# Patient Record
Sex: MALE | Race: ASIAN
[De-identification: names, ages, dates, MRNs, and addresses within clinical notes are randomized per-mention and may not be internally consistent; named-entity substitution may affect disease eponyms.]

---

## 2020-12-10 ENCOUNTER — HOSPITAL ENCOUNTER (OUTPATIENT)
Dept: HOSPITAL 35 - LAB | Age: 55
End: 2020-12-10
Attending: INTERNAL MEDICINE
Payer: COMMERCIAL

## 2020-12-10 DIAGNOSIS — U07.1: Primary | ICD-10-CM

## 2020-12-21 ENCOUNTER — HOSPITAL ENCOUNTER (OUTPATIENT)
Dept: HOSPITAL 35 - RAD | Age: 55
End: 2020-12-21
Attending: INTERNAL MEDICINE
Payer: COMMERCIAL

## 2020-12-21 ENCOUNTER — HOSPITAL ENCOUNTER (INPATIENT)
Dept: HOSPITAL 35 - ER | Age: 55
LOS: 3 days | Discharge: HOME | DRG: 177 | End: 2020-12-24
Attending: INTERNAL MEDICINE | Admitting: INTERNAL MEDICINE
Payer: COMMERCIAL

## 2020-12-21 VITALS — DIASTOLIC BLOOD PRESSURE: 70 MMHG | SYSTOLIC BLOOD PRESSURE: 134 MMHG

## 2020-12-21 VITALS — DIASTOLIC BLOOD PRESSURE: 86 MMHG | SYSTOLIC BLOOD PRESSURE: 132 MMHG

## 2020-12-21 VITALS — BODY MASS INDEX: 26.18 KG/M2 | HEIGHT: 70.98 IN | WEIGHT: 187 LBS

## 2020-12-21 VITALS — DIASTOLIC BLOOD PRESSURE: 74 MMHG | SYSTOLIC BLOOD PRESSURE: 134 MMHG

## 2020-12-21 VITALS — SYSTOLIC BLOOD PRESSURE: 121 MMHG | DIASTOLIC BLOOD PRESSURE: 86 MMHG

## 2020-12-21 VITALS — DIASTOLIC BLOOD PRESSURE: 90 MMHG | SYSTOLIC BLOOD PRESSURE: 128 MMHG

## 2020-12-21 DIAGNOSIS — U07.1: Primary | ICD-10-CM

## 2020-12-21 DIAGNOSIS — R74.01: ICD-10-CM

## 2020-12-21 DIAGNOSIS — Z79.899: ICD-10-CM

## 2020-12-21 DIAGNOSIS — J96.01: ICD-10-CM

## 2020-12-21 DIAGNOSIS — R91.8: Primary | ICD-10-CM

## 2020-12-21 DIAGNOSIS — Z88.0: ICD-10-CM

## 2020-12-21 DIAGNOSIS — J12.89: ICD-10-CM

## 2020-12-21 LAB
ALBUMIN SERPL-MCNC: 3.2 G/DL (ref 3.4–5)
ALT SERPL-CCNC: 69 U/L (ref 30–65)
ANION GAP SERPL CALC-SCNC: 11 MMOL/L (ref 7–16)
AST SERPL-CCNC: 72 U/L (ref 15–37)
BASOPHILS NFR BLD AUTO: 0.8 % (ref 0–2)
BE(VIVO): -1.3 MMOL/L
BILIRUB DIRECT SERPL-MCNC: 0.2 MG/DL
BILIRUB SERPL-MCNC: 0.6 MG/DL (ref 0.2–1)
BUN SERPL-MCNC: 16 MG/DL (ref 7–18)
CALCIUM SERPL-MCNC: 8.4 MG/DL (ref 8.5–10.1)
CHLORIDE SERPL-SCNC: 101 MMOL/L (ref 98–107)
CO2 SERPL-SCNC: 26 MMOL/L (ref 21–32)
CREAT SERPL-MCNC: 1.1 MG/DL (ref 0.7–1.3)
EOSINOPHIL NFR BLD: 0.1 % (ref 0–3)
ERYTHROCYTE [DISTWIDTH] IN BLOOD BY AUTOMATED COUNT: 13.1 % (ref 10.5–14.5)
GLUCOSE SERPL-MCNC: 102 MG/DL (ref 74–106)
GRANULOCYTES NFR BLD MANUAL: 76.4 % (ref 36–66)
HCO3 BLD-SCNC: 22.6 MMOL/L (ref 22–26)
HCT VFR BLD CALC: 42.8 % (ref 42–52)
HGB BLD-MCNC: 14.3 GM/DL (ref 14–18)
LYMPHOCYTES NFR BLD AUTO: 16.5 % (ref 24–44)
MCH RBC QN AUTO: 29.9 PG (ref 26–34)
MCHC RBC AUTO-ENTMCNC: 33.6 G/DL (ref 28–37)
MCV RBC: 89.2 FL (ref 80–100)
MONOCYTES NFR BLD: 6.2 % (ref 1–8)
NEUTROPHILS # BLD: 3.6 THOU/UL (ref 1.4–8.2)
PCO2 BLD: 35.6 MMHG (ref 35–45)
PLATELET # BLD: 235 THOU/UL (ref 150–400)
PO2 BLD: 73.9 MMHG (ref 80–100)
POTASSIUM SERPL-SCNC: 4 MMOL/L (ref 3.5–5.1)
PROT SERPL-MCNC: 7 G/DL (ref 6.4–8.2)
RBC # BLD AUTO: 4.79 MIL/UL (ref 4.5–6)
SODIUM SERPL-SCNC: 138 MMOL/L (ref 136–145)
TROPONIN I SERPL-MCNC: <0.06 NG/ML (ref ?–0.06)
WBC # BLD AUTO: 4.7 THOU/UL (ref 4–11)

## 2020-12-21 PROCEDURE — XW13325 TRANSFUSION OF CONVALESCENT PLASMA (NONAUTOLOGOUS) INTO PERIPHERAL VEIN, PERCUTANEOUS APPROACH, NEW TECHNOLOGY GROUP 5: ICD-10-PCS | Performed by: INTERNAL MEDICINE

## 2020-12-21 NOTE — EKG
[address] [place]  [address]
Phone:  [phone]                    ELECTROCARDIOGRAM REPORT      
_______________________________________________________________________________
 
Name:       PAWAN VIEYRA                      Room #:         170-1       ADM IN  
M.R.#:      [id number]     Account #:      [id number]  
Admission:  20    Attend Phys:    Armando Witt MD      
Discharge:              Date of Birth:  [birth year]  
                                                          Report #: [id number]
   [id number]
_______________________________________________________________________________
                         [facility] ED
                                       
Test Date:    2020               Test Time:    11:43:48
Pat Name:     PAWAN VIEYRA                 Department:   
Patient ID:   [id number]            Room:         170
Gender:       M                        Technician:   leonel
:          1965               Requested By: Ramirze Roche
Order Number: [id number] MD:   Fausto Delarosa
                                 Measurements
Intervals                              Axis          
Rate:         86                       P:            7
MO:           160                      QRS:          -2
QRSD:         83                       T:            -22
QT:           371                                    
QTc:          444                                    
                           Interpretive Statements
Sinus rhythm
Left ventricular hypertrophy
Nonspecific T abnormalities, inferior leads
No previous ECG available for comparison
Electronically Signed On 2020 13:24:56 CST by Fausto Delarosa
[email or web address]
 
 
 
 
 
 
 
 
 
 
 
 
 
 
 
 
 
 
 
 
 
  <ELECTRONICALLY SIGNED>
   By: Fausto Delarosa MD, [facility]    
  20     1324
D: 20 1143                           _____________________________________
T: 20 1143                           Fausto Delarosa MD, FACC      /EPI

## 2020-12-21 NOTE — NUR
ADMITTED TO ROOM  AT THIS TIME. RESPIRATIONS ARE EVEN NON LABORED. PLEASANT
WITH CARES. WILL CONT TO MINITOR AND ASSIST AS NEEDED.

## 2020-12-22 VITALS — SYSTOLIC BLOOD PRESSURE: 124 MMHG | DIASTOLIC BLOOD PRESSURE: 91 MMHG

## 2020-12-22 VITALS — DIASTOLIC BLOOD PRESSURE: 81 MMHG | SYSTOLIC BLOOD PRESSURE: 112 MMHG

## 2020-12-22 VITALS — SYSTOLIC BLOOD PRESSURE: 128 MMHG | DIASTOLIC BLOOD PRESSURE: 85 MMHG

## 2020-12-22 VITALS — SYSTOLIC BLOOD PRESSURE: 130 MMHG | DIASTOLIC BLOOD PRESSURE: 83 MMHG

## 2020-12-22 VITALS — SYSTOLIC BLOOD PRESSURE: 140 MMHG | DIASTOLIC BLOOD PRESSURE: 85 MMHG

## 2020-12-22 LAB
ALBUMIN SERPL-MCNC: 3.2 G/DL (ref 3.4–5)
ALT SERPL-CCNC: 78 U/L (ref 30–65)
ANION GAP SERPL CALC-SCNC: 12 MMOL/L (ref 7–16)
AST SERPL-CCNC: 66 U/L (ref 15–37)
BASOPHILS NFR BLD AUTO: 0.6 % (ref 0–2)
BILIRUB SERPL-MCNC: 0.6 MG/DL (ref 0.2–1)
BUN SERPL-MCNC: 18 MG/DL (ref 7–18)
CALCIUM SERPL-MCNC: 8.9 MG/DL (ref 8.5–10.1)
CHLORIDE SERPL-SCNC: 104 MMOL/L (ref 98–107)
CO2 SERPL-SCNC: 26 MMOL/L (ref 21–32)
CREAT SERPL-MCNC: 1 MG/DL (ref 0.7–1.3)
EOSINOPHIL NFR BLD: 0 % (ref 0–3)
ERYTHROCYTE [DISTWIDTH] IN BLOOD BY AUTOMATED COUNT: 13 % (ref 10.5–14.5)
FIBRINOGEN PPP-MCNC: 426 MG/DL (ref 210–360)
GLUCOSE SERPL-MCNC: 134 MG/DL (ref 74–106)
GRANULOCYTES NFR BLD MANUAL: 70.6 % (ref 36–66)
HCT VFR BLD CALC: 42.1 % (ref 42–52)
HGB BLD-MCNC: 14.2 GM/DL (ref 14–18)
INR PPP: 1
LYMPHOCYTES NFR BLD AUTO: 21.7 % (ref 24–44)
MCH RBC QN AUTO: 30.3 PG (ref 26–34)
MCHC RBC AUTO-ENTMCNC: 33.8 G/DL (ref 28–37)
MCV RBC: 89.5 FL (ref 80–100)
MONOCYTES NFR BLD: 7.1 % (ref 1–8)
NEUTROPHILS # BLD: 2.1 THOU/UL (ref 1.4–8.2)
PLATELET # BLD: 262 THOU/UL (ref 150–400)
POTASSIUM SERPL-SCNC: 4.6 MMOL/L (ref 3.5–5.1)
PROT SERPL-MCNC: 7.9 G/DL (ref 6.4–8.2)
PROTHROMBIN TIME: 9.9 SECONDS (ref 9.3–11.4)
RBC # BLD AUTO: 4.7 MIL/UL (ref 4.5–6)
SODIUM SERPL-SCNC: 142 MMOL/L (ref 136–145)
WBC # BLD AUTO: 3 THOU/UL (ref 4–11)

## 2020-12-22 PROCEDURE — XW033E5 INTRODUCTION OF REMDESIVIR ANTI-INFECTIVE INTO PERIPHERAL VEIN, PERCUTANEOUS APPROACH, NEW TECHNOLOGY GROUP 5: ICD-10-PCS | Performed by: INTERNAL MEDICINE

## 2020-12-22 NOTE — NUR
UP AD AVILA IN ROOM WITH APPROPRAITE EXERCISE FOR LUNGS. NOTED TO BE MORE
IMPROVED IN HIS MOOD TODAY. SPEECH IS MUCH CLEARER. HAD FIRST DOSE OF REM.
WILL CONT WITH PLAN OF CARE.

## 2020-12-22 NOTE — NUR
INITIAL ASSESSMENT:
SW reviewed chart and spoke with nursing and attending physician. Pt was
admitted from home. Pt placed in Enhanced Isolation due to COVID-19. Pt had
positive COVID test on 12/10. Pt's wife and family have also tested positive.
Pt is afebrile and not requiring O2. Pt had convalescent plasma and is
completing courses of Remdesivir and Ivermectin. Pt is normally independent
with ADLs. 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 SW is following to assist as needed
with discharge planning.

## 2020-12-22 NOTE — NUR
ASSUMED CARE OF PATIENT AT 2300. ADMISSION COMPLETED. CONVALESCENT PLASMA
GIVEN. TOLERATED WELL. O2 SAT IN MID 90'S ON ROOM AIR. AFEBRILE. DENIES PAIN
OR OTHER SYMPTOMS. POC GOALS ESTABLISHED. WILL CONTINUE TO MONITOR.

## 2020-12-23 VITALS — SYSTOLIC BLOOD PRESSURE: 111 MMHG | DIASTOLIC BLOOD PRESSURE: 84 MMHG

## 2020-12-23 VITALS — SYSTOLIC BLOOD PRESSURE: 122 MMHG | DIASTOLIC BLOOD PRESSURE: 89 MMHG

## 2020-12-23 VITALS — SYSTOLIC BLOOD PRESSURE: 119 MMHG | DIASTOLIC BLOOD PRESSURE: 86 MMHG

## 2020-12-23 VITALS — DIASTOLIC BLOOD PRESSURE: 79 MMHG | SYSTOLIC BLOOD PRESSURE: 110 MMHG

## 2020-12-23 LAB
ALBUMIN SERPL-MCNC: 3 G/DL (ref 3.4–5)
ALT SERPL-CCNC: 88 U/L (ref 30–65)
ANION GAP SERPL CALC-SCNC: 11 MMOL/L (ref 7–16)
AST SERPL-CCNC: 61 U/L (ref 15–37)
BILIRUB DIRECT SERPL-MCNC: 0.2 MG/DL
BILIRUB SERPL-MCNC: 0.6 MG/DL (ref 0.2–1)
BUN SERPL-MCNC: 19 MG/DL (ref 7–18)
CALCIUM SERPL-MCNC: 8.7 MG/DL (ref 8.5–10.1)
CHLORIDE SERPL-SCNC: 105 MMOL/L (ref 98–107)
CO2 SERPL-SCNC: 25 MMOL/L (ref 21–32)
CREAT SERPL-MCNC: 0.9 MG/DL (ref 0.7–1.3)
GLUCOSE SERPL-MCNC: 108 MG/DL (ref 74–106)
PHOSPHATE SERPL-MCNC: 3.9 MG/DL (ref 2.5–4.9)
POTASSIUM SERPL-SCNC: 4.2 MMOL/L (ref 3.5–5.1)
PROT SERPL-MCNC: 6.8 G/DL (ref 6.4–8.2)
SODIUM SERPL-SCNC: 141 MMOL/L (ref 136–145)

## 2020-12-23 NOTE — NUR
PATIENT HAS RESTED IN BED AT THIS TIME. NO COMPLAIN OF PAIN. RESPIRATIONS ARE
NON LABORD. WILLCONT WITH PLAN OF CARE.

## 2020-12-23 NOTE — NUR
PATIENT SLEPT THROUGH MOST OF THE NIGHT. PT UP AD AVILA; STEADY GAIT. PT HAD NO
COMPLAINTS. DISCUSSED CHANGING TIMES OF MEDICATIONS THIS AM. CONTINUING TO
ASSESS ACCORDING TO POC.

## 2020-12-24 VITALS — DIASTOLIC BLOOD PRESSURE: 84 MMHG | SYSTOLIC BLOOD PRESSURE: 115 MMHG

## 2020-12-24 VITALS — DIASTOLIC BLOOD PRESSURE: 83 MMHG | SYSTOLIC BLOOD PRESSURE: 117 MMHG

## 2020-12-24 LAB
ALBUMIN SERPL-MCNC: 3 G/DL (ref 3.4–5)
ALT SERPL-CCNC: 97 U/L (ref 30–65)
ANION GAP SERPL CALC-SCNC: 11 MMOL/L (ref 7–16)
AST SERPL-CCNC: 56 U/L (ref 15–37)
BILIRUB DIRECT SERPL-MCNC: 0.1 MG/DL
BILIRUB SERPL-MCNC: 0.6 MG/DL (ref 0.2–1)
BUN SERPL-MCNC: 21 MG/DL (ref 7–18)
CALCIUM SERPL-MCNC: 8.7 MG/DL (ref 8.5–10.1)
CHLORIDE SERPL-SCNC: 104 MMOL/L (ref 98–107)
CO2 SERPL-SCNC: 24 MMOL/L (ref 21–32)
CREAT SERPL-MCNC: 1 MG/DL (ref 0.7–1.3)
GLUCOSE SERPL-MCNC: 102 MG/DL (ref 74–106)
PHOSPHATE SERPL-MCNC: 4 MG/DL (ref 2.5–4.9)
POTASSIUM SERPL-SCNC: 4.1 MMOL/L (ref 3.5–5.1)
PROT SERPL-MCNC: 6.8 G/DL (ref 6.4–8.2)
SODIUM SERPL-SCNC: 139 MMOL/L (ref 136–145)

## 2020-12-24 NOTE — NUR
PT PROGRESSING TOWARDS DC GOALS.  VSS, NO FEVERS, PT STATES NO SOA, JUST A DRY
COUGH.  UP AD AVILA, AND HAS NO COMPLAINTS.  CALL LIGHT WITHIN REACH.

## 2020-12-24 NOTE — HC
[facility]
[address]
[place]   [address]                     CONSULTATION                  
_______________________________________________________________________________
 
Name:       PAWAN VIEYRA                      Room #:         355-P       [facility] [place]  
M.R.#:      [id number]                       Account #:      [id number]  
Admission:  20    Attend Phys:    Armando Witt MD      
Discharge:              Date of Birth:  [birth year]  
                                                          Report #: [id number]
                                                                    [id number]   
_______________________________________________________________________________
THIS REPORT FOR:  
 
cc:  FAM - No family physician/PCP 
     FAM - Family physician unknown                                       
     Ashok Braxton MD                                            ~
 
DATE OF SERVICE:  2020
 
 
INFECTIOUS DISEASE CONSULTATION
 
REASON FOR CONSULTATION:  I was asked to evaluate concerning COVID-19 pneumonia.
 
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
now with his family for his mother's .  Following return, he developed
malaise, myalgias, mild arthralgias and fatigue along with anorexia, mild
distortion in taste associated with loose stools.  Other family members also
contracted the infection.  Everyone else is doing well except for last several
days, she has noticed more shortness of breath with activity.  He returned to
work and was not able to function with all becoming short of breath.  He checked
his O2 saturation is 91%.  He presented for further evaluation.  Chest x-ray
shows bilateral interstitial pulmonary infiltrates.
 
He is otherwise healthy, on no home medications.
 
ALLERGIES:  PENICILLIN with no cephalosporin reaction.
 
PAST MEDICAL HISTORY:  Unremarkable.
 
FAMILY HISTORY:  Unremarkable.
 
SOCIAL HISTORY:  Nonsmoker, no significant alcohol intake.
 
REVIEW OF SYSTEMS:  A 14-point review was negative other than what has been
described above.
 
PHYSICAL EXAMINATION:
GENERAL:  He was alert and cooperative and pleasant.  He was in no distress.  He
was of normal weight.
SKIN:  Without rash.  No adenopathy.
HEENT:  Eyes without scleral icterus or conjunctivitis.  Mouth without
mucositis.
NECK:  Supple.
LUNGS:  Clear anteriorly with few crackles heard in the bases bilaterally.
HEART:  Regular, without murmur, gallop or rub.
 
 
 
[facility]
[address]
[place]   [address]                     CONSULTATION                  
_______________________________________________________________________________
 
Name:       PAWAN VIEYRA                      Room #:         355-P       [facility] [place]  
[facility].#:      [id number]                       Account #:      [id number]  
Admission:  20    Attend Phys:    Armando Witt MD      
Discharge:              Date of Birth:  [birth year]  
                                                          Report #: [id number]
                                                                    [id number]   
_______________________________________________________________________________
 
ABDOMEN:  Soft and nontender with no hepatosplenomegaly or mass.
EXTREMITIES:  No peripheral edema.
NEUROLOGIC:  Nonfocal.
 
LABORATORY STUDIES:  Reviewed.
 
MICROBIOLOGY:  Reviewed.
 
IMAGING:  Chest x-ray reviewed.
 
IMPRESSION:  A 55-year-old with the second phase of his COVID-19 infection, now
with pulmonary infiltrates and respiratory compromise.  His O2 saturations have
been running less than 94%.  He has mild hepatitis.
 
Discussed treatment options with him.  We discussed remdesivir along with
ivermectin and convalescent plasma.  The patient wished to proceed with the
cocktail along with corticosteroids and vitamins.  The patient will be started
on a 5-day remdesivir protocol and will follow him closely.
 
 
 
 
 
 
 
 
 
 
 
 
 
 
 
 
 
 
 
 
 
 
 
 
 
  <ELECTRONICALLY SIGNED>
   By: Ashok Braxton MD            
  20     0014
D: 20 1651                           _____________________________________
T: 20                           Ashok Braxton MD              /nt

## 2020-12-24 NOTE — NUR
DISCHARGE NOTE:
SW reviewed chart and spoke with nursing and attending physician. Pt remains
in Enhanced Isolation due to COVID-19., Pt is medically stable for discharge
home today. No SW needs identified. Pt's family to provide transportation
home. SW is available to assist should needs arise.

## 2021-01-07 ENCOUNTER — HOSPITAL ENCOUNTER (OUTPATIENT)
Dept: HOSPITAL 35 - RAD | Age: 56
End: 2021-01-07
Attending: INTERNAL MEDICINE
Payer: COMMERCIAL

## 2021-01-07 DIAGNOSIS — J18.9: Primary | ICD-10-CM

## 2021-01-07 DIAGNOSIS — Z88.0: ICD-10-CM
